# Patient Record
Sex: FEMALE | Race: WHITE | Employment: FULL TIME | URBAN - NONMETROPOLITAN AREA
[De-identification: names, ages, dates, MRNs, and addresses within clinical notes are randomized per-mention and may not be internally consistent; named-entity substitution may affect disease eponyms.]

---

## 2020-02-13 ENCOUNTER — OFFICE VISIT (OUTPATIENT)
Dept: CARDIOLOGY CLINIC | Age: 60
End: 2020-02-13
Payer: COMMERCIAL

## 2020-02-13 VITALS
HEART RATE: 87 BPM | BODY MASS INDEX: 41.95 KG/M2 | HEIGHT: 70 IN | WEIGHT: 293 LBS | DIASTOLIC BLOOD PRESSURE: 78 MMHG | SYSTOLIC BLOOD PRESSURE: 139 MMHG

## 2020-02-13 PROCEDURE — 99244 OFF/OP CNSLTJ NEW/EST MOD 40: CPT | Performed by: INTERNAL MEDICINE

## 2020-02-13 PROCEDURE — 93000 ELECTROCARDIOGRAM COMPLETE: CPT | Performed by: INTERNAL MEDICINE

## 2020-02-13 RX ORDER — PIOGLITAZONEHYDROCHLORIDE 30 MG/1
30 TABLET ORAL DAILY
COMMUNITY

## 2020-02-13 RX ORDER — AMLODIPINE BESYLATE 10 MG/1
10 TABLET ORAL DAILY
COMMUNITY

## 2020-02-13 RX ORDER — FUROSEMIDE 20 MG/1
20 TABLET ORAL DAILY
Qty: 60 TABLET | Refills: 3 | Status: SHIPPED | OUTPATIENT
Start: 2020-02-13 | End: 2020-08-13

## 2020-02-13 RX ORDER — GEMFIBROZIL 600 MG/1
600 TABLET, FILM COATED ORAL
COMMUNITY

## 2020-02-13 RX ORDER — LOSARTAN POTASSIUM AND HYDROCHLOROTHIAZIDE 12.5; 1 MG/1; MG/1
1 TABLET ORAL DAILY
COMMUNITY

## 2020-02-13 RX ORDER — METOPROLOL SUCCINATE 50 MG/1
50 TABLET, EXTENDED RELEASE ORAL DAILY
COMMUNITY
End: 2022-09-15 | Stop reason: ALTCHOICE

## 2020-02-13 RX ORDER — LEVOTHYROXINE SODIUM 112 UG/1
112 TABLET ORAL DAILY
COMMUNITY

## 2020-02-13 RX ORDER — PEN NEEDLE, DIABETIC 31 GX5/16"
1 NEEDLE, DISPOSABLE MISCELLANEOUS DAILY
COMMUNITY
End: 2021-09-22

## 2020-02-13 RX ORDER — CLONIDINE HYDROCHLORIDE 0.1 MG/1
0.1 TABLET ORAL 2 TIMES DAILY
COMMUNITY

## 2020-02-13 RX ORDER — M-VIT,TX,IRON,MINS/CALC/FOLIC 27MG-0.4MG
1 TABLET ORAL DAILY
COMMUNITY

## 2020-02-13 RX ORDER — LISINOPRIL 10 MG/1
10 TABLET ORAL DAILY
COMMUNITY

## 2020-02-13 NOTE — PROGRESS NOTES
aneurysm at age 38    Past Surgical History   Cholecystectomy  Colonoscopy     Review of Systems   Constitutional: Negative for chills and fever  HENT: Negative for congestion, sinus pressure, sneezing and sore throat. Eyes: Negative for pain, discharge, redness and itching. Respiratory: Negative for apnea, cough  Gastrointestinal: Negative for blood in stool, constipation, diarrhea   Endocrine: Negative for cold intolerance, heat intolerance, polydipsia. Genitourinary: Negative for dysuria, enuresis, flank pain and hematuria. Musculoskeletal: Negative for arthralgias, joint swelling and neck pain. Neurological: Negative for numbness and headaches. Psychiatric/Behavioral: Negative for agitation, confusion, decreased concentration and dysphoric mood. Objective: There were no vitals taken for this visit. Wt Readings from Last 3 Encounters:   No data found for Wt     BP Readings from Last 3 Encounters:   No data found for BP       Nursing note and vitals reviewed. Physical Exam   Constitutional: Oriented to person, place, and time. Appears well-developed and well-nourished. ENT: Moist mucous membranes. No bleeding. Tongue is midline. Head: Normocephalic and atraumatic. Eyes: EOM are normal. Pupils are equal, round, and reactive to light. Neck: Normal range of motion. Neck supple. No JVD present. Cardiovascular: Normal rate, regular rhythm, III/VI systolic murmur, no rubs, and intact distal pulses. Pulmonary/Chest: Effort normal and breath sounds normal. No respiratory distress. No wheezes. No rales. Abdominal: Soft. Bowel sounds are normal. No distension. There is no tenderness. Musculoskeletal: Normal range of motion. +1 pitting edema, right>left. Right leg mildly erythematous. Neurological: Alert and oriented to person, place, and time. No cranial nerve deficit. Coordination normal.   Skin: Skin is warm and dry. Psychiatric: Normal mood and affect.        No results found for: CKTOTAL, CKMB, CKMBINDEX    No results found for: WBC, RBC, HGB, HCT, MCV, MCH, MCHC, RDW, PLT, MPV    No results found for: NA, K, CL, CO2, BUN, LABALBU, CREATININE, CALCIUM, GFRAA, LABGLOM, GLUCOSE    No results found for: ALKPHOS, ALT, AST, PROT, BILITOT, BILIDIR, IBILI, LABALBU    No results found for: MG    No results found for: INR, PROTIME      No results found for: LABA1C    No results found for: TRIG, HDL, LDLCALC, LDLDIRECT, LABVLDL    No results found for: TSH      Testing Reviewed:      I have individually reviewed the cardiac test below:    ECHO: No results found for this or any previous visit. Ekg:   EKG Interpretation:  normal sinus rhythm, anterior infarct. Stress Test:02/04/2020  Patient Name: Jaden Juarez MR #: [id number] YOB: 1960 Gender: F Service Type: DGT Pt. Type: O Ordering Phys: Jatinder Chavez MD Account #: [id number] Admitting Phys: Accession #: [id number] Family Phys: Additional CC Phys: CARDIOLOGY REPORT EXAM: STRESS ECHOCARDIOGRAM Exam Date: 02/04/2020 Exam Time: 11:00:14 CLINICAL HISTORY: chest pain Study Date: 2020-02-04 11:00:14 Referring Physician: Vu Every: 1407 Quality: Good Procedures: Stress Echo Report: Treadmill stress echocardiogram. Indications: Chest Pain, and The patient was educated about the procedure. Findings: ECG At Rest: Resting ECG shows normal sinus rhythm. Procedure Data: [place] Protocol. Baseline HR 79 BPM. Baseline /80. Target  BPM. Predicted Maximal  BPM. Exercise Time 1:02. RPP 28715 BPMmmHg. METS Achieved 4.6. Peak  BPM. BP at Peak Exercise 170/85. Percent HR Achieved 75 %. M-Mode measurements: LAD 4.7 cm. AoRD 3.1 cm. RVIDs . 99cm. LVPWs 1.48 cm. LVIDs 4.17 IVSs 1.65cm. LVPWd 1.2 cm. LVIDd 6.1 cm. IVSd 1.0 cm. Ejection Fraction 60%. Arrhythmia: No arrhythmias noted during stress. ECG Post Exercise: No diagnostic ST changes.  Resting LV Function: Normal left ventricular size and global systolic function. Baseline Ejection Fraction is estimated at 60 %. Exercise Stress LV Function: Physiologic decrease in the LV chamber size with appropriate augmentation in global and regional contractility relative to pre-stress. Conclusions: Submaximal stress test with no ECG evidence of ischemia. Unfortunately due to patient's extreme limitation, she was not able to exercise more than 1 minute and was not able to acheive target heart rate. This test is inconclusive for ischemia. Consider a chemical nuclear stress test if clinically indicated. Electronically Signed By: Lisbeth Irene M.D 2020-02-04 18:40:10 EST [id number]  Status: Completed 4-Feb-2020 11:00  Indication:Chest pain   Echocardiogram Stress Doppler [facility]        AssessmentPlan:     Mariela Cespedes is a pleasant 60year old male patient who has past medical history of DM, HTN, Dyslipidemia, hypothyroidism, and colon adenoma. The patient has recently undergone an exercise stress echocardiogram to evaluate for chest pain. Per stress test report available, study was submaximal due to patient's severe limitation, he could not exercise more that one minute, THR was not achieved, test was inconclusive for ischemia. Further evaluation and consideration for nuclear stress test was recommended. She chronic leg swelling, right side worse with some redness. The patient reports retrosternal chest pain, tightness like, lasting up to 10 minutes, 7/10 in severity, without radiation without. This episode occurred two months and have occurred intermittently since then (milder, once per week). She reports shortness of breath and dyspnea on exertion that have worsened over the past few months. Even climbing few steps gives her shortness of breath and dyspnea. Patient denies orthopnea, paroxysmal nocturnal dyspnea, palpitations, dizziness, syncope, weight gain. 1. Chest pain  2. Worsening Dyspnea on exertion   3. Worsening SOB  4. LE edema  5.  LE pain,

## 2020-02-19 ENCOUNTER — TELEPHONE (OUTPATIENT)
Dept: CARDIOLOGY CLINIC | Age: 60
End: 2020-02-19

## 2020-02-19 LAB
B-TYPE NATRIURETIC PEPTIDE: 71 PG/ML
BUN BLDV-MCNC: 24 MG/DL
CALCIUM SERPL-MCNC: 9.9 MG/DL
CHLORIDE BLD-SCNC: 100 MMOL/L
CHOLESTEROL, TOTAL: 159 MG/DL
CHOLESTEROL/HDL RATIO: NORMAL
CO2: 25 MMOL/L
CREAT SERPL-MCNC: 0.8 MG/DL
GFR CALCULATED: NORMAL
GLUCOSE BLD-MCNC: 191 MG/DL
HDLC SERPL-MCNC: 43 MG/DL (ref 35–70)
LDL CHOLESTEROL CALCULATED: 80 MG/DL (ref 0–160)
POTASSIUM SERPL-SCNC: 4.7 MMOL/L
SODIUM BLD-SCNC: 140 MMOL/L
TRIGL SERPL-MCNC: 179 MG/DL
VLDLC SERPL CALC-MCNC: 36 MG/DL

## 2020-03-04 ENCOUNTER — HOSPITAL ENCOUNTER (OUTPATIENT)
Dept: NON INVASIVE DIAGNOSTICS | Age: 60
Discharge: HOME | End: 2020-03-04
Payer: COMMERCIAL

## 2020-03-04 ENCOUNTER — HOSPITAL ENCOUNTER (OUTPATIENT)
Dept: INTERVENTIONAL RADIOLOGY/VASCULAR | Age: 60
Discharge: HOME | End: 2020-03-04
Payer: COMMERCIAL

## 2020-03-04 VITALS — BODY MASS INDEX: 41.95 KG/M2 | WEIGHT: 293 LBS | HEIGHT: 70 IN

## 2020-03-04 LAB
LV EF: 63 %
LVEF MODALITY: NORMAL

## 2020-03-04 PROCEDURE — 93970 EXTREMITY STUDY: CPT

## 2020-03-04 PROCEDURE — A9500 TC99M SESTAMIBI: HCPCS | Performed by: INTERNAL MEDICINE

## 2020-03-04 PROCEDURE — 78452 HT MUSCLE IMAGE SPECT MULT: CPT

## 2020-03-04 PROCEDURE — 93306 TTE W/DOPPLER COMPLETE: CPT

## 2020-03-04 PROCEDURE — 3430000000 HC RX DIAGNOSTIC RADIOPHARMACEUTICAL: Performed by: INTERNAL MEDICINE

## 2020-03-04 PROCEDURE — 6360000002 HC RX W HCPCS

## 2020-03-04 PROCEDURE — 93017 CV STRESS TEST TRACING ONLY: CPT

## 2020-03-04 RX ADMIN — Medication 35 MILLICURIE: at 12:05

## 2020-03-04 RX ADMIN — Medication 10 MILLICURIE: at 11:00

## 2020-03-10 ENCOUNTER — TELEPHONE (OUTPATIENT)
Dept: CARDIOLOGY CLINIC | Age: 60
End: 2020-03-10

## 2020-08-13 ENCOUNTER — OFFICE VISIT (OUTPATIENT)
Dept: CARDIOLOGY CLINIC | Age: 60
End: 2020-08-13
Payer: COMMERCIAL

## 2020-08-13 VITALS
DIASTOLIC BLOOD PRESSURE: 72 MMHG | HEART RATE: 81 BPM | WEIGHT: 293 LBS | SYSTOLIC BLOOD PRESSURE: 128 MMHG | BODY MASS INDEX: 41.95 KG/M2 | HEIGHT: 70 IN

## 2020-08-13 PROCEDURE — 93000 ELECTROCARDIOGRAM COMPLETE: CPT | Performed by: INTERNAL MEDICINE

## 2020-08-13 PROCEDURE — 99214 OFFICE O/P EST MOD 30 MIN: CPT | Performed by: INTERNAL MEDICINE

## 2020-08-13 RX ORDER — AMOXICILLIN 500 MG/1
500 CAPSULE ORAL 3 TIMES DAILY
COMMUNITY
End: 2021-02-23

## 2020-08-13 RX ORDER — CEPHALEXIN 500 MG/1
500 CAPSULE ORAL 3 TIMES DAILY
Qty: 21 CAPSULE | Refills: 0 | Status: SHIPPED | OUTPATIENT
Start: 2020-08-13 | End: 2020-08-20

## 2020-08-13 RX ORDER — FUROSEMIDE 40 MG/1
40 TABLET ORAL DAILY
Qty: 40 TABLET | Refills: 3 | Status: SHIPPED | OUTPATIENT
Start: 2020-08-13 | End: 2021-01-22 | Stop reason: SDUPTHER

## 2020-08-13 NOTE — PROGRESS NOTES
Pt here for 6 mo check up     EKG done today    Pt continues with sob , chest pressure and heaviness,     Pt denies heart palpitations, dizziness, swelling in legs and feet, redness notices more in right lower leg

## 2020-08-13 NOTE — PROGRESS NOTES
[address] [place] [address],[place] [address]  [place] [address]  Dept: [phone]  Dept Fax: [phone]  Loc: [phone]    Visit Date: 8/13/2020    Ms. Phil Lizarraga is a 60 y.o. female  who presented for:    Leg edema  Left leg redness    HPI:   KRISH Leonard is a pleasant 60year old female patient who  has a past medical history of Diabetes mellitus ([address] and Hypertension. She was previously evaluated for chest pain, RAVI, leg edema. Stress test 03/2020 revealed an anterior attenuation artifact, no apparent ischemia. Echocardiogram 03/2020 revealed LVH, EF 60-65%, mild MR, Mild TR, RVSP 35-40mmHg, possible PFO/?ASD with normal RA/RV. The patient reports having occasional chest discomfort and RAVI. For the past two weeks, the patient has noticed right leg discomfort and redness. She has mild chills. Patient denies orthopnea, paroxysmal nocturnal dyspnea, palpitations, dizziness, syncope, weight gain.        Current Outpatient Medications:     Insulin NPH Isophane & Regular (NOVOLIN 70/30 FLEXPEN) (70-30) 100 UNIT per ML injection pen, Inject into the skin 2 times daily, Disp: , Rfl:     pioglitazone (ACTOS) 30 MG tablet, Take 30 mg by mouth daily, Disp: , Rfl:     aspirin 81 MG tablet, Take 81 mg by mouth daily, Disp: , Rfl:     cloNIDine (CATAPRES) 0.1 MG tablet, Take 0.1 mg by mouth 2 times daily, Disp: , Rfl:     losartan-hydrochlorothiazide (HYZAAR) 100-12.5 MG per tablet, Take 1 tablet by mouth daily, Disp: , Rfl:     sitaGLIPtan-metformin (JANUMET)  MG per tablet, Take 1 tablet by mouth 2 times daily (with meals), Disp: , Rfl:     lisinopril (PRINIVIL;ZESTRIL) 10 MG tablet, Take 10 mg by mouth daily, Disp: , Rfl:     gemfibrozil (LOPID) 600 MG tablet, Take 600 mg by mouth 2 times daily (before meals), Disp: , Rfl:     metoprolol succinate (TOPROL XL) 50 MG extended release tablet, Take 50 mg by mouth daily, Disp: , Rfl:    amLODIPine (NORVASC) 10 MG tablet, Take 10 mg by mouth daily, Disp: , Rfl:     Insulin Pen Needle (PEN NEEDLES 31GX5/16\") 31G X 8 MM MISC, 1 each by Does not apply route daily, Disp: , Rfl:     blood glucose test strips (RELION GLUCOSE TEST STRIPS) strip, 1 each by In Vitro route daily As needed. , Disp: , Rfl:     blood glucose test strips (RELION GLUCOSE TEST STRIPS) strip, 1 each by In Vitro route daily As needed. , Disp: , Rfl:     levothyroxine (SYNTHROID) 112 MCG tablet, Take 112 mcg by mouth Daily, Disp: , Rfl:     Calcium Carb-Cholecalciferol (CALCIUM 600+D3 PO), Take by mouth, Disp: , Rfl:     Coenzyme Q10-Red Yeast Rice (CO Q-10 PLUS RED YEAST RICE PO), Take by mouth, Disp: , Rfl:     Multiple Vitamins-Minerals (THERAPEUTIC MULTIVITAMIN-MINERALS) tablet, Take 1 tablet by mouth daily, Disp: , Rfl:     furosemide (LASIX) 20 MG tablet, Take 1 tablet by mouth daily, Disp: 60 tablet, Rfl: 3    Past Medical History  Lauryn Samaniego  has a past medical history of Diabetes mellitus ([facility] [address] and Hypertension. Social History  Lauryn Samaniego  reports that she has never smoked. She has never used smokeless tobacco.    Family History  Lauryn Samaniego family history is not on file. There is no family history of bicuspid aortic valve, aneurysms, heart transplant, pacemakers, defibrillators, or sudden cardiac death. Past Surgical History   Past Surgical History:   Procedure Laterality Date    GALLBLADDER SURGERY         Review of Systems   Constitutional: Negative for chills and fever  HENT: Negative for congestion, sinus pressure, sneezing and sore throat. Eyes: Negative for pain, discharge, redness and itching. Respiratory: Negative for apnea, cough  Gastrointestinal: Negative for blood in stool, constipation, diarrhea   Endocrine: Negative for cold intolerance, heat intolerance, polydipsia. Genitourinary: Negative for dysuria, enuresis, flank pain and hematuria.    Musculoskeletal: Negative for arthralgias, joint swelling and neck pain.   Neurological: Negative for numbness and headaches. Psychiatric/Behavioral: Negative for agitation, confusion, decreased concentration and dysphoric mood. Objective: There were no vitals taken for this visit. Wt Readings from Last 3 Encounters:   03/04/20 300 lb (136.1 kg)   02/13/20 (!) 314 lb 6.4 oz (142.6 kg)     BP Readings from Last 3 Encounters:   02/13/20 139/78       Nursing note and vitals reviewed. Physical Exam   Constitutional: Oriented to person, place, and time. Appears well-developed and well-nourished. ENT: Moist mucous membranes. No bleeding. Tongue is midline. Head: Normocephalic and atraumatic. Eyes: EOM are normal. Pupils are equal, round, and reactive to light. Neck: Normal range of motion. Neck supple. No JVD present. Cardiovascular: Normal rate, regular rhythm, sysolic murmur, no rubs, and intact distal pulses. Pulmonary/Chest: Effort normal and breath sounds normal. No respiratory distress. No wheezes. No rales. Abdominal: Soft. Bowel sounds are normal. No distension. There is no tenderness. Musculoskeletal: Normal range of motion. positive edema. Right leg erythema and warmth    Neurological: Alert and oriented to person, place, and time. No cranial nerve deficit. Coordination normal.   Skin: Skin is warm and dry. Psychiatric: Normal mood and affect.        No results found for: CKTOTAL, CKMB, CKMBINDEX    No results found for: WBC, RBC, HGB, HCT, MCV, MCH, MCHC, RDW, PLT, MPV    Lab Results   Component Value Date     02/19/2020    K 4.7 02/19/2020     02/19/2020    CO2 25.0 02/19/2020    BUN 24 02/19/2020    CREATININE 0.8 02/19/2020    CALCIUM 9.9 02/19/2020    GLUCOSE 191 02/19/2020       No results found for: ALKPHOS, ALT, AST, PROT, BILITOT, BILIDIR, IBILI, LABALBU    No results found for: MG    No results found for: INR, PROTIME      No results found for: LABA1C    Lab Results   Component Value Date    TRIG 179 02/19/2020    HDL 43 02/19/2020    [address] 02/19/2020       No results found for: TSH      Testing Reviewed:      I have individually reviewed the cardiac test below:    ECHO:   Results for orders placed during the hospital encounter of 03/04/20   Echo 2D w doppler w color complete    Narrative Transthoracic Echocardiography Report (TTE)     Demographics      Patient Name     Humberto Aquino Gender                 Female      MR #             [id number]     Race                                                        Ethnicity      Account #        [de-identified]     Room Number      Accession Number [id number]     Date of Study          03/04/2020      Date of Birth    1960    Referring Physician    Nicole Velez MD      Age              60 year(s)    Erwin Cuevas [facility]                                     Interpreting Physician Nicole Velez MD     Procedure    Type of Study      TTE procedure:ECHOCARDIOGRAM COMPLETE 2D W DOPPLER W COLOR. Procedure Date  Date: 03/04/2020 Start: 09:39 AM    Study Location: Echo Lab  Technical Quality: Adequate visualization    Indications:Congestive heart failure and Increased shortness of breath. Additional Medical History:RAVI, Hypertension, Diabetes, Chest pain, Leg  edema, Colon cancer, Family history of heart disease    Patient Status: Routine    Height: 69.69 inches Weight: 300.01 pounds BSA: 2.47 m^2 BMI: 43.44 kg/m^2    BP: 139/78 mmHg     Conclusions      Summary   Left ventricle size is normal.   Mild concentric left ventricular hypertrophy. There were no regional wall motion abnormalities. Ejection fraction is visually estimated in the range of 60% to 65%. Mild mitral regurgitation is present. Mild tricuspid regurgitation. Right ventricular systolic pressure of 56-85 mmHg consistent with mild   pulmonary hypertension. Possible PFO/ASD visualized. Further interrogation recommended if   clinically indicated.       Signature 147   Septum         ml/60 m^2LV ESV/LV ESV    RV Diastolic Dimension: 2.7 cm   Diastolic: 1.1 Index: 71.4 ml/22 m^2   cm             EF Calculated: 63 %       LA/Aorta: 1.52                                               LA volume/Index: 68.3 ml /28m^2     Doppler Measurements & Calculations      MV Peak E-Wave: 104 cm/s   AV Peak Velocity: 149 LVOT Peak Velocity: 88.7   MV Peak A-Wave: 101 cm/s   cm/s                  cm/s   MV E/A Ratio: 1.03         AV Peak Gradient:     LVOT Peak Gradient: 3   MV Peak Gradient: 4.33     8.88 mmHg             mmHg   mmHg                                                    TV Peak E-Wave: 56.6 cm/s   MV Deceleration Time: 165                        TV Peak A-Wave: 69 cm/s   msec                              IVRT: 60 msec         TV Peak Gradient: 1.28                                                    mmHg   MV E' Septal Velocity: 6.2                       TR Velocity:276 cm/s   cm/s                       AV DVI (Vmax):0.6     TR Gradient:30.47 mmHg   MV A' Septal Velocity: 9.2                       PV Peak Velocity: 83.1   cm/s                                             cm/s   MV E' Lateral Velocity:                          PV Peak Gradient: 2.76   6.7 cm/s                                         mmHg   MV A' Lateral Velocity:   10.2 cm/s   E/E' septal: 16.77   E/E' lateral: 15.52   MR Velocity: 419 cm/s     [email or web address] [id number]%[id number]        Ekg:   EKG Interpretation:  normal EKG, normal sinus rhythm, nonspecific ST and T waves changes, unchanged from previous tracings. Stress Test:03/2020   Summary   LVEF is preserved. There is mild attenuation artifact noted in the anterior wall seems to be   related to breast artifact. No segmental wall motion abnormalities on gated study. This study was negative for ischemia. Recommendation   Medical management. Clinical correlation is recommended. AssessmentPlan:     Quique Jimenez is a pleasant 60year old female patient who  has a past medical history of Diabetes mellitus ([address] and Hypertension. She was previously evaluated for chest pain, RAVI, leg edema. Stress test 03/2020 revealed an anterior attenuation artifact, no apparent ischemia. Echocardiogram 03/2020 revealed LVH, EF 60-65%, mild MR, Mild TR, RVSP 35-40mmHg, possible PFO/?ASD with normal RA/RV. 1. CP, RAVI, Leg edema - improved   2. HFpEF, chronic  3. Mild pulmonary hypertension   4. PFO Vs ??ASD  5. LVH  6. Hypertensive cardiovascular disease  7. Mild mitral regurgitation  8. EF 60-65% on Echo 03/2020  9. DM  10. Hypertension   11. Left cellulitis      Stress 03/2020 revealed an anterior wall attenuation artifact, no apparent ischemia    · LIMIT SALT INTAKE  · Daily weight   · Lasix 20 mg po daily  · Increase lasix to 40 mg po daily  · BMP, BNP, Mg in one week   · ASA 81 mg po daily   · Patient counseled about cellulitis and need to see PCP, will see soon  · Start Keflex       Above findings and plan of care were discussed with patient in details, patient's questions were answered.      Disposition:  RTC in 6 months             Electronically signed by Topher Sampson MD, [address], [place]    8/13/2020 at 1:54 PM EDT

## 2020-09-02 LAB
B-TYPE NATRIURETIC PEPTIDE: 177 PG/ML
BUN BLDV-MCNC: 28 MG/DL
CALCIUM SERPL-MCNC: 9.6 MG/DL
CHLORIDE BLD-SCNC: 100 MMOL/L
CO2: 24 MMOL/L
CREAT SERPL-MCNC: NORMAL MG/DL
GFR CALCULATED: NORMAL
GLUCOSE BLD-MCNC: 146 MG/DL
MAGNESIUM: 2.1 MG/DL
POTASSIUM SERPL-SCNC: 4.7 MMOL/L
SODIUM BLD-SCNC: 138 MMOL/L

## 2021-01-22 RX ORDER — FUROSEMIDE 40 MG/1
40 TABLET ORAL DAILY
Qty: 90 TABLET | Refills: 3 | Status: SHIPPED | OUTPATIENT
Start: 2021-01-22 | End: 2021-09-22

## 2021-02-23 ENCOUNTER — OFFICE VISIT (OUTPATIENT)
Dept: CARDIOLOGY CLINIC | Age: 61
End: 2021-02-23
Payer: COMMERCIAL

## 2021-02-23 VITALS
BODY MASS INDEX: 41.95 KG/M2 | DIASTOLIC BLOOD PRESSURE: 79 MMHG | HEIGHT: 70 IN | WEIGHT: 293 LBS | SYSTOLIC BLOOD PRESSURE: 128 MMHG | HEART RATE: 76 BPM

## 2021-02-23 DIAGNOSIS — I50.32 CHRONIC HEART FAILURE WITH PRESERVED EJECTION FRACTION (HFPEF) (HCC): Primary | ICD-10-CM

## 2021-02-23 PROCEDURE — 99214 OFFICE O/P EST MOD 30 MIN: CPT | Performed by: INTERNAL MEDICINE

## 2021-02-23 NOTE — PROGRESS NOTES
[address] [facility] [address]  [place] [address]  Dept: [phone]  Dept Fax: [phone]  Loc: [phone]    Visit Date: 2/23/2021    Ms. Suzy Rahman is a 63 y.o. female  who presented for:    Leg edema  HFpEF    HPI:   KRISH Gregory is a pleasant 63year old female patient who  has a past medical history of Diabetes mellitus ([address] and Hypertension. She was previously evaluated for chest pain, RAVI, leg edema. Stress test 03/2020 revealed an anterior attenuation artifact, no apparent ischemia. Echocardiogram 03/2020 revealed LVH, EF 60-65%, mild MR, Mild TR, RVSP 35-40mmHg, possible PFO/?ASD with normal RA/RV. She has chronic leg edema, improved with stockings, followed at lymphedema clinic. Patient denies chest pain, orthopnea, paroxysmal nocturnal dyspnea, palpitations, dizziness, syncope, weight gain. She reports occasional shortness of breath, dyspnea on exertion.        Current Outpatient Medications:     furosemide (LASIX) 40 MG tablet, Take 1 tablet by mouth daily, Disp: 90 tablet, Rfl: 3    amoxicillin (AMOXIL) 500 MG capsule, Take 500 mg by mouth 3 times daily, Disp: , Rfl:     Insulin NPH Isophane & Regular (NOVOLIN 70/30 FLEXPEN) (70-30) 100 UNIT per ML injection pen, Inject into the skin 2 times daily, Disp: , Rfl:     pioglitazone (ACTOS) 30 MG tablet, Take 30 mg by mouth daily, Disp: , Rfl:     aspirin 81 MG tablet, Take 81 mg by mouth daily, Disp: , Rfl:     cloNIDine (CATAPRES) 0.1 MG tablet, Take 0.1 mg by mouth 2 times daily, Disp: , Rfl:     losartan-hydrochlorothiazide (HYZAAR) 100-12.5 MG per tablet, Take 1 tablet by mouth daily, Disp: , Rfl:     sitaGLIPtan-metformin (JANUMET)  MG per tablet, Take 1 tablet by mouth 2 times daily (with meals), Disp: , Rfl:     lisinopril (PRINIVIL;ZESTRIL) 10 MG tablet, Take 10 mg by mouth daily, Disp: , Rfl:     gemfibrozil (LOPID) 600 MG tablet, Take 600 mg by mouth 2 times daily (before meals), Disp: , Rfl:     metoprolol succinate (TOPROL XL) 50 MG extended release tablet, Take 50 mg by mouth daily, Disp: , Rfl:     amLODIPine (NORVASC) 10 MG tablet, Take 10 mg by mouth daily, Disp: , Rfl:     Insulin Pen Needle (PEN NEEDLES 31GX5/16\") 31G X 8 MM MISC, 1 each by Does not apply route daily, Disp: , Rfl:     blood glucose test strips (RELION GLUCOSE TEST STRIPS) strip, 1 each by In Vitro route daily As needed. , Disp: , Rfl:     blood glucose test strips (RELION GLUCOSE TEST STRIPS) strip, 1 each by In Vitro route daily As needed. , Disp: , Rfl:     levothyroxine (SYNTHROID) 112 MCG tablet, Take 112 mcg by mouth Daily, Disp: , Rfl:     Calcium Carb-Cholecalciferol (CALCIUM 600+D3 PO), Take by mouth, Disp: , Rfl:     Coenzyme Q10-Red Yeast Rice (CO Q-10 PLUS RED YEAST RICE PO), Take by mouth, Disp: , Rfl:     Multiple Vitamins-Minerals (THERAPEUTIC MULTIVITAMIN-MINERALS) tablet, Take 1 tablet by mouth daily, Disp: , Rfl:     Past Medical History  Charlee Kaufman  has a past medical history of Diabetes mellitus ([address] and Hypertension. Social History  Charlee Kaufman  reports that she has never smoked. She has never used smokeless tobacco.    Family History  Charlee Kaufman family history is not on file. There is no family history of bicuspid aortic valve, aneurysms, heart transplant, pacemakers, defibrillators, or sudden cardiac death. Past Surgical History   Past Surgical History:   Procedure Laterality Date    GALLBLADDER SURGERY         Review of Systems   Constitutional: Negative for chills and fever  HENT: Negative for congestion, sinus pressure, sneezing and sore throat. Eyes: Negative for pain, discharge, redness and itching. Respiratory: Negative for apnea, cough  Gastrointestinal: Negative for blood in stool, constipation, diarrhea   Endocrine: Negative for cold intolerance, heat intolerance, polydipsia.   Genitourinary: Negative for dysuria, enuresis, flank pain and hematuria. Musculoskeletal: Negative for arthralgias, joint swelling and neck pain. Neurological: Negative for numbness and headaches. Psychiatric/Behavioral: Negative for agitation, confusion, decreased concentration and dysphoric mood. Objective: There were no vitals taken for this visit. Wt Readings from Last 3 Encounters:   08/13/20 (!) 326 lb 2 oz (147.9 kg)   03/04/20 300 lb (136.1 kg)   02/13/20 (!) 314 lb 6.4 oz (142.6 kg)     BP Readings from Last 3 Encounters:   08/13/20 128/72   02/13/20 139/78       Nursing note and vitals reviewed. Physical Exam   Constitutional: Oriented to person, place, and time. Appears well-developed and well-nourished. ENT: Moist mucous membranes. No bleeding. Tongue is midline. Head: Normocephalic and atraumatic. Eyes: EOM are normal. Pupils are equal, round, and reactive to light. Neck: Normal range of motion. Neck supple. No JVD present. Cardiovascular: Normal rate, regular rhythm, murmur, no rubs, and intact distal pulses. Pulmonary/Chest: Effort normal and breath sounds normal. No respiratory distress. No wheezes. No rales. Abdominal: Soft. Bowel sounds are normal. No distension. There is no tenderness. Musculoskeletal: Normal range of motion. trace edema. Neurological: Alert and oriented to person, place, and time. No cranial nerve deficit. Coordination normal.   Skin: Skin is warm and dry. Psychiatric: Normal mood and affect.        No results found for: CKTOTAL, CKMB, CKMBINDEX    No results found for: WBC, RBC, HGB, HCT, MCV, MCH, MCHC, RDW, PLT, MPV    Lab Results   Component Value Date     09/02/2020    K 4.7 09/02/2020     09/02/2020    CO2 24.0 09/02/2020    BUN 28 09/02/2020    CREATININE 0.8 02/19/2020    CALCIUM 9.6 09/02/2020    GLUCOSE 146 09/02/2020       No results found for: ALKPHOS, ALT, AST, PROT, BILITOT, BILIDIR, IBILI, LABALBU    Lab Results   Component Value Date    MG 2.1 09/02/2020       No results found for: INR, PROTIME      No results found for: LABA1C    Lab Results   Component Value Date    TRIG 179 02/19/2020    HDL 43 02/19/2020    LDLCALC 80 02/19/2020       No results found for: TSH      Testing Reviewed:      I have individually reviewed the cardiac test below:    ECHO:   Results for orders placed during the hospital encounter of 03/04/20   Echo 2D w doppler w color complete    Narrative Transthoracic Echocardiography Report (TTE)     Demographics      Patient Name     Karmen Ortiz Gender                 Female      MR #             [id number]     Race                                                        Ethnicity      Account #        [de-identified]     Room Number      Accession Number [id number]     Date of Study          03/04/2020      Date of Birth    1960    Referring Physician    Hector Monteiro MD      Age              60 year(s)    Luanne Ferraro FRANKY                                     Interpreting Physician Hector Monteiro MD     Procedure    Type of Study      TTE procedure:ECHOCARDIOGRAM COMPLETE 2D W DOPPLER W COLOR. Procedure Date  Date: 03/04/2020 Start: 09:39 AM    Study Location: Echo Lab  Technical Quality: Adequate visualization    Indications:Congestive heart failure and Increased shortness of breath. Additional Medical History:RAVI, Hypertension, Diabetes, Chest pain, Leg  edema, Colon cancer, Family history of heart disease    Patient Status: Routine    Height: 69.69 inches Weight: 300.01 pounds BSA: 2.47 m^2 BMI: 43.44 kg/m^2    BP: 139/78 mmHg     Conclusions      Summary   Left ventricle size is normal.   Mild concentric left ventricular hypertrophy. There were no regional wall motion abnormalities. Ejection fraction is visually estimated in the range of 60% to 65%. Mild mitral regurgitation is present. Mild tricuspid regurgitation. Right ventricular systolic pressure of 76-66 mmHg consistent with mild   pulmonary hypertension. Possible PFO/ASD visualized. Further interrogation recommended if   clinically indicated. Signature      ----------------------------------------------------------------   Electronically signed by Aditya Reyes MD (Interpreting   physician) on 03/04/2020 at 05:12 PM   ----------------------------------------------------------------      Findings      Mitral Valve   Structurally normal mitral valve. Mild mitral regurgitation is present. Aortic Valve   The aortic valve was trileaflet with normal thickness and cuspal   separation. DOPPLER: Transaortic velocity was within the normal range with   no evidence of aortic stenosis. There was no evidence of aortic   regurgitation. Tricuspid Valve   Tricuspid valve is structurally normal.   Mild tricuspid regurgitation. Right ventricular systolic pressure of 29-14 mmHg consistent with mild   pulmonary hypertension. Pulmonic Valve   Pulmonic valve is structurally normal.      Left Atrium   Normal size left atrium. Left Ventricle   Left ventricle size is normal.   Mild concentric left ventricular hypertrophy. There were no regional wall motion abnormalities. Ejection fraction is visually estimated in the range of 60% to 65%. Right Atrium   The right atrium is of normal size. Possible PFO/ASD visualized. Further interrogation recommended if   clinically indicated. Right Ventricle   Normal right ventricular size and function. Pericardial Effusion   There is a small (trivial) pericardial effusion. Pleural Effusion   No evidence of pleural effusion. Aorta / Great Vessels   The aorta is within normal limits. IVC not visualized.      M-Mode/2D Measurements & Calculations      LV Diastolic   LV Systolic Dimension:    AV Cusp Separation: 2.2 cmLA   Dimension: 5.5 3.6 cm                    Dimension: 4.1 cmAO Root   cm             LV Volume Diastolic: 721  Dimension: 2.7 cmLA Area: 22.1   LV FS:34.6 %   ml cm^2   LV PW          LV Volume Systolic: 41.8   Diastolic: 1.1 ml   cm             LV EDV/LV EDV Index: 147   Septum         ml/60 m^2LV ESV/LV ESV    RV Diastolic Dimension: 2.7 cm   Diastolic: 1.1 Index: 03.0 ml/22 m^2   cm             EF Calculated: 63 %       LA/Aorta: 1.52                                               LA volume/Index: 68.3 ml /28m^2     Doppler Measurements & Calculations      MV Peak E-Wave: 104 cm/s   AV Peak Velocity: 149 LVOT Peak Velocity: 88.7   MV Peak A-Wave: 101 cm/s   cm/s                  cm/s   MV E/A Ratio: 1.03         AV Peak Gradient:     LVOT Peak Gradient: 3   MV Peak Gradient: 4.33     8.88 mmHg             mmHg   mmHg                                                    TV Peak E-Wave: 56.6 cm/s   MV Deceleration Time: 165                        TV Peak A-Wave: 69 cm/s   msec                              IVRT: 60 msec         TV Peak Gradient: 1.28                                                    mmHg   MV E' Septal Velocity: 6.2                       TR Velocity:276 cm/s   cm/s                       AV DVI (Vmax):0.6     TR Gradient:30.47 mmHg   MV A' Septal Velocity: 9.2                       PV Peak Velocity: 83.1   cm/s                                             cm/s   MV E' Lateral Velocity:                          PV Peak Gradient: 2.76   6.7 cm/s                                         mmHg   MV A' Lateral Velocity:   10.2 cm/s   E/E' septal: 16.77   E/E' lateral: 15.52   MR Velocity: 419 cm/s     [email or web address] [id number]%3d%3d      Stress Test:03/2020   Summary   LVEF is preserved.   There is mild attenuation artifact noted in the anterior wall seems to be   related to breast artifact.   No segmental wall motion abnormalities on gated study.   This study was negative for ischemia.      Recommendation   Medical management.   Clinical correlation is recommended.       AssessmentPlan:   Ant Varghese is a pleasant 63year old female patient who  has a past medical history of Diabetes mellitus ([address] and Hypertension. She was previously evaluated for chest pain, RAVI, leg edema. Stress test 03/2020 revealed an anterior attenuation artifact, no apparent ischemia. Echocardiogram 03/2020 revealed LVH, EF 60-65%, mild MR, Mild TR, RVSP 35-40mmHg, possible PFO/?ASD with normal RA/RV. She has chronic leg edema, improved with stockings, followed at lymphedema clinic. Patient denies chest pain, orthopnea, paroxysmal nocturnal dyspnea, palpitations, dizziness, syncope, weight gain. She reports occasional shortness of breath, dyspnea on exertion. 1. Leg edema - improved  2. HFpEF, chronic  3. Mild pulmonary hypertension   4. PFO Vs ??ASD  5. LVH  6. Hypertensive cardiovascular disease  7. Mild mitral regurgitation  8. EF 60-65% on Echo 03/2020  9. DM  10. Hypertension   11. Leg cellulitis during previous office visit      · Stress 03/2020 revealed an anterior wall attenuation artifact, no apparent ischemia    · LIMIT SALT INTAKE  · Daily weight   · Lasix 40 mg po daily  · BMP, Mg in one week   · ASA 81 mg po daily   · Normal RV/RA on prior Echocardiogram        Above findings and plan of care were discussed with patient in details, patient's questions were answered.      Disposition:  RTC in 6 months             Electronically signed by Johnathan Blue MD, [facility]    2/23/2021 at 10:48 AM EST

## 2021-09-22 ENCOUNTER — OFFICE VISIT (OUTPATIENT)
Dept: CARDIOLOGY CLINIC | Age: 61
End: 2021-09-22
Payer: COMMERCIAL

## 2021-09-22 VITALS
DIASTOLIC BLOOD PRESSURE: 62 MMHG | BODY MASS INDEX: 41.95 KG/M2 | HEIGHT: 70 IN | HEART RATE: 84 BPM | WEIGHT: 293 LBS | SYSTOLIC BLOOD PRESSURE: 112 MMHG

## 2021-09-22 DIAGNOSIS — I50.32 CHRONIC HEART FAILURE WITH PRESERVED EJECTION FRACTION (HFPEF) (HCC): ICD-10-CM

## 2021-09-22 DIAGNOSIS — R00.2 INTERMITTENT PALPITATIONS: ICD-10-CM

## 2021-09-22 DIAGNOSIS — R06.02 SOB (SHORTNESS OF BREATH) ON EXERTION: Primary | ICD-10-CM

## 2021-09-22 PROCEDURE — 93000 ELECTROCARDIOGRAM COMPLETE: CPT | Performed by: INTERNAL MEDICINE

## 2021-09-22 PROCEDURE — 99214 OFFICE O/P EST MOD 30 MIN: CPT | Performed by: INTERNAL MEDICINE

## 2021-09-22 RX ORDER — FUROSEMIDE 40 MG/1
40 TABLET ORAL DAILY
Qty: 90 TABLET | Refills: 3 | Status: SHIPPED | OUTPATIENT
Start: 2021-09-22 | End: 2021-09-22 | Stop reason: SDUPTHER

## 2021-09-22 NOTE — PROGRESS NOTES
[address] [facility] [address]  [place] [address]  Dept: [phone]  Dept Fax: [phone]  Loc: [phone]    Visit Date: 9/22/2021    Ms. Denia Ley is a 63 y.o. female  who presented for:    RAVI  HFpEF     HPI:   KRISH Mena is a pleasant 63year old female patient who  has a past medical history of Acquired lymphedema of leg, Diabetes mellitus ([address] and Hypertension. She was previously evaluated for chest pain, RAVI, leg edema. Stress test 03/2020 revealed an anterior attenuation artifact, no apparent ischemia. Echocardiogram 03/2020 revealed LVH, EF 60-65%, mild MR, Mild TR, RVSP 35-40mmHg, possible PFO/?ASD with normal RA/RV. She has chronic leg edema, improved with stockings, followed at lymphedema clinic. The patient reports chronic RAVI, no recent worsening. Patient denies chest pain, orthopnea, paroxysmal nocturnal dyspnea, palpitations, dizziness, syncope, weight gain. She wears stockings, leg swelling is better.        Current Outpatient Medications:     furosemide (LASIX) 40 MG tablet, Take 1 tablet by mouth daily, Disp: 90 tablet, Rfl: 3    Insulin NPH Isophane & Regular (NOVOLIN 70/30 FLEXPEN) (70-30) 100 UNIT per ML injection pen, Inject into the skin 2 times daily INJECT 26 UNITS BID, Disp: , Rfl:     pioglitazone (ACTOS) 30 MG tablet, Take 30 mg by mouth daily, Disp: , Rfl:     aspirin 81 MG tablet, Take 81 mg by mouth daily, Disp: , Rfl:     cloNIDine (CATAPRES) 0.1 MG tablet, Take 0.1 mg by mouth 2 times daily, Disp: , Rfl:     losartan-hydrochlorothiazide (HYZAAR) 100-12.5 MG per tablet, Take 1 tablet by mouth daily, Disp: , Rfl:     sitaGLIPtan-metformin (JANUMET)  MG per tablet, Take 1 tablet by mouth 2 times daily (with meals), Disp: , Rfl:     lisinopril (PRINIVIL;ZESTRIL) 10 MG tablet, Take 10 mg by mouth daily, Disp: , Rfl:     gemfibrozil (LOPID) 600 MG tablet, Take 600 mg by mouth 2 times pain and hematuria. Musculoskeletal: Negative for arthralgias, joint swelling and neck pain. Neurological: Negative for numbness and headaches. Psychiatric/Behavioral: Negative for agitation, confusion, decreased concentration and dysphoric mood. Objective: There were no vitals taken for this visit. Wt Readings from Last 3 Encounters:   02/23/21 (!) 320 lb 12.8 oz (145.5 kg)   08/13/20 (!) 326 lb 2 oz (147.9 kg)   03/04/20 300 lb (136.1 kg)     BP Readings from Last 3 Encounters:   02/23/21 128/79   08/13/20 128/72   02/13/20 139/78       Nursing note and vitals reviewed. Physical Exam   Constitutional: Oriented to person, place, and time. Appears well-developed and well-nourished. ENT: Moist mucous membranes. No bleeding. Tongue is midline. Head: Normocephalic and atraumatic. Eyes: EOM are normal. Pupils are equal, round, and reactive to light. Neck: Normal range of motion. Neck supple. No JVD present. Cardiovascular: Normal rate, regular rhythm, murmur, no rubs, and intact distal pulses. Pulmonary/Chest: Effort normal and breath sounds normal. No respiratory distress. No wheezes. No rales. Abdominal: Soft. Bowel sounds are normal. No distension. There is no tenderness. Musculoskeletal: Normal range of motion. +1 edema. Neurological: Alert and oriented to person, place, and time. No cranial nerve deficit. Coordination normal.   Skin: Skin is warm and dry. Psychiatric: Normal mood and affect.        No results found for: CKTOTAL, CKMB, CKMBINDEX    No results found for: WBC, RBC, HGB, HCT, MCV, MCH, MCHC, RDW, PLT, MPV    Lab Results   Component Value Date     09/02/2020    K 4.7 09/02/2020     09/02/2020    CO2 24.0 09/02/2020    BUN 28 09/02/2020    CREATININE 0.8 02/19/2020    CALCIUM 9.6 09/02/2020    GLUCOSE 146 09/02/2020       No results found for: ALKPHOS, ALT, AST, PROT, BILITOT, BILIDIR, IBILI, LABALBU    Lab Results   Component Value Date    MG 2.1 09/02/2020       No results found for: INR, PROTIME      No results found for: LABA1C    Lab Results   Component Value Date    TRIG 179 02/19/2020    HDL 43 02/19/2020    LDLCALC 80 02/19/2020       No results found for: TSH      Testing Reviewed:      I have individually reviewed the cardiac test below:    ECHO: Results for orders placed during the hospital encounter of 03/04/20    Echo 2D w doppler w color complete    Narrative  Transthoracic Echocardiography Report (TTE)    Demographics    Patient Name     Dimple Randall Gender                 Female    MR #             [id number]     Race                       Ethnicity    Account #        [de-identified]     Room Number    Accession Number [id number]     Date of Study          03/04/2020    Date of Birth    1960    Referring Physician    Santhosh Mitchell MD    Age              60 year(s)    Al[facility]    Interpreting Physician Santhosh Mitchell MD    Procedure    Type of Study    TTE procedure:ECHOCARDIOGRAM COMPLETE 2D W DOPPLER W COLOR. Procedure Date  Date: 03/04/2020 Start: 09:39 AM    Study Location: Echo Lab  Technical Quality: Adequate visualization    Indications:Congestive heart failure and Increased shortness of breath. Additional Medical History:RAVI, Hypertension, Diabetes, Chest pain, Leg  edema, Colon cancer, Family history of heart disease    Patient Status: Routine    Height: 69.69 inches Weight: 300.01 pounds BSA: 2.47 m^2 BMI: 43.44 kg/m^2    BP: 139/78 mmHg    Conclusions    Summary  Left ventricle size is normal.  Mild concentric left ventricular hypertrophy. There were no regional wall motion abnormalities. Ejection fraction is visually estimated in the range of 60% to 65%. Mild mitral regurgitation is present. Mild tricuspid regurgitation. Right ventricular systolic pressure of 86-63 mmHg consistent with mild  pulmonary hypertension. Possible PFO/ASD visualized.  Further interrogation recommended ml/60 m^2LV ESV/LV ESV    RV Diastolic Dimension: 2.7 cm  Diastolic: 1.1 Index: 27.3 ml/22 m^2  cm             EF Calculated: 63 %       LA/Aorta: 1.52    LA volume/Index: 68.3 ml /28m^2    Doppler Measurements & Calculations    MV Peak E-Wave: 104 cm/s   AV Peak Velocity: 149 LVOT Peak Velocity: 88.7  MV Peak A-Wave: 101 cm/s   cm/s                  cm/s  MV E/A Ratio: 1.03         AV Peak Gradient:     LVOT Peak Gradient: 3  MV Peak Gradient: 4.33     8.88 mmHg             mmHg  mmHg  TV Peak E-Wave: 56.6 cm/s  MV Deceleration Time: 165                        TV Peak A-Wave: 69 cm/s  msec  IVRT: 60 msec         TV Peak Gradient: 1.28  mmHg  MV E' Septal Velocity: 6.2                       TR Velocity:276 cm/s  cm/s                       AV DVI (Vmax):0.6     TR Gradient:30.47 mmHg  MV A' Septal Velocity: 9.2                       PV Peak Velocity: 83.1  cm/s                                             cm/s  MV E' Lateral Velocity:                          PV Peak Gradient: 2.76  6.7 cm/s                                         mmHg  MV A' Lateral Velocity:  10.2 cm/s  E/E' septal: 16.77  E/E' lateral: 15.52  MR Velocity: 419 cm/s    [email or web address] [id number]%3d       Ekg:   EKG Interpretation:  normal EKG, normal sinus rhythm, nonspecific ST and T waves changes, unchanged from previous tracings. Stress Test:03/2020   Summary   LVEF is preserved.   There is mild attenuation artifact noted in the anterior wall seems to be   related to breast artifact.   No segmental wall motion abnormalities on gated study.   This study was negative for ischemia.      Recommendation   Medical management.   Clinical correlation is recommended    AssessmentPlan:   Hanh Jennings is a pleasant 63year old female patient who  has a past medical history of Acquired lymphedema of leg, Diabetes mellitus ([address] and Hypertension. She was previously evaluated for chest pain, RAVI, leg edema. Stress test 03/2020 revealed an anterior attenuation artifact, no apparent ischemia. Echocardiogram 03/2020 revealed LVH, EF 60-65%, mild MR, Mild TR, RVSP 35-40mmHg, possible PFO/?ASD with normal RA/RV. She has chronic leg edema, improved with stockings, followed at lymphedema clinic. The patient reports chronic RAVI, no recent worsening. Patient denies chest pain, orthopnea, paroxysmal nocturnal dyspnea, palpitations, dizziness, syncope, weight gain. She wears stockings, leg swelling is better. 1. Dyspnea on exertion   2. Chronic Leg edema   3. HFpEF, chronic  4. Mild pulmonary hypertension   5. PFO Vs ??ASD (Echo 03/2020)   6. LVH  7. Hypertensive cardiovascular disease  8. Mild mitral regurgitation  9. EF 60-65%   10. DM  11. Hypertension   12. Leg cellulitis during previous office visit      Normal RV/RA on prior Echocardiogram     On Lasix    leg edema persists    PFO/ASD was suspected on prior Echo   She reports RAVI    No acute EKG changes    On lasix    Labs reviewed    K 4.2   Cr 0.9    Increase lasix to 40 mg po daily and 20 mg po Q PM   Check Echo    Avoid salt    ASA   Norvasc    toprol    Lisinopril    Losartan-HCTZ    bp controlled   The patient was instructed to check the blood pressure at home, and record the readings. Patient will call office with blood pressure readings, will adjust patient's antihypertensive medications as needed accordingly      Above findings and plan of care were discussed with patient in details, patient's questions were answered.      Disposition:  RTC in 1 YEAR             Electronically signed by Anum Mora MD, [facility]    9/22/2021 at 12:13 PM EDT

## 2021-09-23 RX ORDER — FUROSEMIDE 40 MG/1
40 TABLET ORAL SEE ADMIN INSTRUCTIONS
Qty: 135 TABLET | Refills: 3 | Status: SHIPPED | OUTPATIENT
Start: 2021-09-23 | End: 2022-09-15 | Stop reason: SDUPTHER

## 2021-10-06 ENCOUNTER — HOSPITAL ENCOUNTER (OUTPATIENT)
Dept: NON INVASIVE DIAGNOSTICS | Age: 61
Discharge: HOME | End: 2021-10-06
Payer: COMMERCIAL

## 2021-10-06 DIAGNOSIS — I50.32 CHRONIC HEART FAILURE WITH PRESERVED EJECTION FRACTION (HFPEF) (HCC): ICD-10-CM

## 2021-10-06 DIAGNOSIS — R06.02 SOB (SHORTNESS OF BREATH) ON EXERTION: ICD-10-CM

## 2021-10-06 DIAGNOSIS — R00.2 INTERMITTENT PALPITATIONS: ICD-10-CM

## 2021-10-06 LAB
LV EF: 63 %
LVEF MODALITY: NORMAL

## 2021-10-06 PROCEDURE — 93306 TTE W/DOPPLER COMPLETE: CPT

## 2021-10-07 ENCOUNTER — TELEPHONE (OUTPATIENT)
Dept: CARDIOLOGY | Age: 61
End: 2021-10-07

## 2022-09-13 LAB
BUN BLDV-MCNC: 18 MG/DL
CALCIUM SERPL-MCNC: 9.2 MG/DL
CHLORIDE BLD-SCNC: 105 MMOL/L
CO2: 24 MMOL/L
CREAT SERPL-MCNC: 0.7 MG/DL
GFR CALCULATED: NORMAL
GLUCOSE BLD-MCNC: 145 MG/DL
MAGNESIUM: 2.1 MG/DL
POTASSIUM SERPL-SCNC: 4.5 MMOL/L
SODIUM BLD-SCNC: 141 MMOL/L

## 2022-09-15 ENCOUNTER — OFFICE VISIT (OUTPATIENT)
Dept: CARDIOLOGY CLINIC | Age: 62
End: 2022-09-15
Payer: COMMERCIAL

## 2022-09-15 VITALS
WEIGHT: 293 LBS | SYSTOLIC BLOOD PRESSURE: 146 MMHG | HEART RATE: 76 BPM | HEIGHT: 70 IN | BODY MASS INDEX: 41.95 KG/M2 | DIASTOLIC BLOOD PRESSURE: 79 MMHG

## 2022-09-15 DIAGNOSIS — I50.32 CHRONIC HEART FAILURE WITH PRESERVED EJECTION FRACTION (HFPEF) (HCC): ICD-10-CM

## 2022-09-15 DIAGNOSIS — R00.2 INTERMITTENT PALPITATIONS: ICD-10-CM

## 2022-09-15 DIAGNOSIS — R06.02 SOB (SHORTNESS OF BREATH) ON EXERTION: Primary | ICD-10-CM

## 2022-09-15 PROCEDURE — 93000 ELECTROCARDIOGRAM COMPLETE: CPT | Performed by: INTERNAL MEDICINE

## 2022-09-15 PROCEDURE — 99214 OFFICE O/P EST MOD 30 MIN: CPT | Performed by: INTERNAL MEDICINE

## 2022-09-15 RX ORDER — MELOXICAM 15 MG/1
15 TABLET ORAL DAILY
COMMUNITY

## 2022-09-15 RX ORDER — FUROSEMIDE 40 MG/1
40 TABLET ORAL SEE ADMIN INSTRUCTIONS
Qty: 135 TABLET | Refills: 3 | Status: SHIPPED | OUTPATIENT
Start: 2022-09-15

## 2022-09-15 RX ORDER — METOPROLOL SUCCINATE 100 MG/1
100 TABLET, EXTENDED RELEASE ORAL DAILY
Qty: 30 TABLET | Refills: 3 | Status: SHIPPED | OUTPATIENT
Start: 2022-09-15 | End: 2022-09-21 | Stop reason: SDUPTHER

## 2022-09-15 NOTE — PROGRESS NOTES
[id number] [facility] [place] [address]  [place] [address]  Dept: [phone]  Dept Fax: [phone]  Loc: [phone]    Visit Date: 9/15/2022    Ms. Anibal Howard is a 57 y.o. female  who presented for:  Chief Complaint   Patient presents with    1 Year Follow Up    Check-Up    Congestive Heart Failure   HFpEF  HPI:   HPI   Luanne Leslie is a pleasant 57year old female patient who  has a past medical history of Acquired lymphedema of leg, Diabetes mellitus ([address] and Hypertension. Stress test 03/2020 revealed an anterior attenuation artifact, no apparent ischemia. Has known h/o HFpEF. Echocardiogram 10/2021 revealed EF 60-65%, Trivial MR, Trace TR. She wearing compression stocking, on diuretics, swelling is better. Has mild chronic SOB, stable. Patient denies chest pain, orthopnea, paroxysmal nocturnal dyspnea, palpitations, dizziness, syncope, weight gain. EKG today revealed SR, poor progression of R wave in the precordial leads, nonspecific TW changes. BP today in office was 146/76.       Current Outpatient Medications:     meloxicam (MOBIC) 15 MG tablet, Take 15 mg by mouth daily, Disp: , Rfl:     furosemide (LASIX) 40 MG tablet, Take 1 tablet by mouth See Admin Instructions TAKE 40 MG PO Q AM AND 20 MG PO Q PM, Disp: 135 tablet, Rfl: 3    Insulin NPH Isophane & Regular (HUMULIN;NOVOLIN) (70-30) 100 UNIT per ML injection pen, Inject into the skin 2 times daily INJECT 26 UNITS BID, Disp: , Rfl:     pioglitazone (ACTOS) 30 MG tablet, Take 30 mg by mouth daily, Disp: , Rfl:     aspirin 81 MG tablet, Take 81 mg by mouth daily, Disp: , Rfl:     cloNIDine (CATAPRES) 0.1 MG tablet, Take 0.1 mg by mouth 2 times daily, Disp: , Rfl:     losartan-hydroCHLOROthiazide (HYZAAR) 100-12.5 MG per tablet, Take 1 tablet by mouth daily, Disp: , Rfl:     sitaGLIPtan-metFORMIN (JANUMET)  MG per tablet, Take 1 tablet by mouth 2 times daily (with meals), Disp: , Rfl:     lisinopril (PRINIVIL;ZESTRIL) 10 MG tablet, Take 10 mg by mouth daily, Disp: , Rfl:     gemfibrozil (LOPID) 600 MG tablet, Take 600 mg by mouth 2 times daily (before meals), Disp: , Rfl:     metoprolol succinate (TOPROL XL) 50 MG extended release tablet, Take 50 mg by mouth daily, Disp: , Rfl:     amLODIPine (NORVASC) 10 MG tablet, Take 10 mg by mouth daily, Disp: , Rfl:     blood glucose test strips (ASCENSIA AUTODISC VI;ONE TOUCH ULTRA TEST VI) strip, 1 each by In Vitro route daily As needed. , Disp: , Rfl:     levothyroxine (SYNTHROID) 112 MCG tablet, Take 112 mcg by mouth Daily, Disp: , Rfl:     Calcium Carb-Cholecalciferol (CALCIUM 600+D3 PO), Take by mouth, Disp: , Rfl:     Coenzyme Q10-Red Yeast Rice (CO Q-10 PLUS RED YEAST RICE PO), Take by mouth, Disp: , Rfl:     Multiple Vitamins-Minerals (THERAPEUTIC MULTIVITAMIN-MINERALS) tablet, Take 1 tablet by mouth daily, Disp: , Rfl:     Past Medical History  Irvin Aldridge  has a past medical history of Acquired lymphedema of leg, Diabetes mellitus ([address] and Hypertension. Social History  Irvin Aldridge  reports that she has never smoked. She has never used smokeless tobacco.    Family History  Irvin Aldridge family history is not on file. There is no family history of bicuspid aortic valve, aneurysms, heart transplant, pacemakers, defibrillators, or sudden cardiac death. Past Surgical History   Past Surgical History:   Procedure Laterality Date    GALLBLADDER SURGERY         Review of Systems   Constitutional: Negative for chills and fever  HENT: Negative for congestion, sinus pressure, sneezing and sore throat. Eyes: Negative for pain, discharge, redness and itching. Respiratory: Negative for apnea, cough  Gastrointestinal: Negative for blood in stool, constipation, diarrhea   Endocrine: Negative for cold intolerance, heat intolerance, polydipsia. Genitourinary: Negative for dysuria, enuresis, flank pain and hematuria.    Musculoskeletal: Negative for arthralgias, joint swelling and neck pain. Neurological: Negative for numbness and headaches. Psychiatric/Behavioral: Negative for agitation, confusion, decreased concentration and dysphoric mood. Objective:     BP (!) 146/79   Pulse 76   Ht 5' 10\" (1.778 m)   Wt (!) 331 lb 3.2 oz (150.2 kg)   BMI 47.52 kg/m²     Wt Readings from Last 3 Encounters:   09/15/22 (!) 331 lb 3.2 oz (150.2 kg)   09/22/21 (!) 330 lb (149.7 kg)   02/23/21 (!) 320 lb 12.8 oz (145.5 kg)     BP Readings from Last 3 Encounters:   09/15/22 (!) 146/79   09/22/21 112/62   02/23/21 128/79       Nursing note and vitals reviewed. Physical Exam   Constitutional: Oriented to person, place, and time. Appears well-developed and well-nourished. ENT: Moist mucous membranes. No bleeding. Tongue is midline. Head: Normocephalic and atraumatic. Eyes: EOM are normal. Pupils are equal, round, and reactive to light. Neck: Normal range of motion. Neck supple. No JVD present. Cardiovascular: Normal rate, regular rhythm, no murmur, no rubs, and intact distal pulses. Pulmonary/Chest: Effort normal and breath sounds normal. No respiratory distress. No wheezes. No rales. Abdominal: Soft. Bowel sounds are normal. No distension. There is no tenderness. Musculoskeletal: Normal range of motion. mild edema. Neurological: Alert and oriented to person, place, and time. No cranial nerve deficit. Coordination normal.   Skin: Skin is warm and dry. Psychiatric: Normal mood and affect.        No results found for: CKTOTAL, CKMB, CKMBINDEX    No results found for: WBC, RBC, HGB, HCT, MCV, MCH, MCHC, RDW, PLT, MPV    Lab Results   Component Value Date/Time     09/13/2022 12:00 AM    K 4.5 09/13/2022 12:00 AM     09/13/2022 12:00 AM    CO2 24 09/13/2022 12:00 AM    BUN 18 09/13/2022 12:00 AM    CREATININE 0.7 09/13/2022 12:00 AM    CALCIUM 9.2 09/13/2022 12:00 AM    GLUCOSE 145 09/13/2022 12:00 AM       No results found for: ALKPHOS, ALT, AST, PROT, BILITOT, BILIDIR, IBILI, LABALBU    Lab Results   Component Value Date/Time    MG 2.1 09/13/2022 12:00 AM       No results found for: INR, PROTIME      No results found for: LABA1C    Lab Results   Component Value Date/Time    TRIG 179 02/19/2020 12:00 AM    HDL 43 02/19/2020 12:00 AM    LDLCALC 80 02/19/2020 12:00 AM       No results found for: TSH      Testing Reviewed:      I have individually reviewed the cardiac test below:    ECHO: Results for orders placed during the hospital encounter of 10/06/21    Echo 2D w doppler w color complete    Narrative  Transthoracic Echocardiography Report (TTE)    Demographics    Patient Name    [address] [facility] Gender                Female    MR #            [id number]     Race                      Ethnicity    Account #       [de-identified]     Room Number    Accession       [id number]    Date of Study         10/06/2021  Number    Date of Birth   1960    Referring Physician   Didier Nicolas [facility]    Age             63 year(s)    Sonographer           Edie Valdez MD  Physician    Procedure    Type of Study    TTE procedure:ECHOCARDIOGRAM COMPLETE 2D W DOPPLER W COLOR. Procedure Date  Date: 10/06/2021 Start: 09:18 AM    Study Location: Echo Lab  Technical Quality: Limited visualization due to body habitus. Indications:Congestive heart failure and Dyspnea on exertion. Additional Medical History:Diabetic, hypertension, edema, LVH    Patient Status: Routine    Height: 70 inches Weight: 330.01 pounds BSA: 2.58 m^2 BMI: 47.35 kg/m^2    BP: 112/62 mmHg    Conclusions    Summary  Normal left ventricle size and systolic function. Ejection fraction was  estimated at 60-65%. There were no regional left ventricular wall motion  abnormalities and wall thickness was within normal limits. Trivial mitral regurgitation.   Trace tricuspid regurgitation. Signature    ----------------------------------------------------------------  Electronically signed by Aneudy Lopez MD (Interpreting  physician) on 10/06/2021 at 03:03 PM  ----------------------------------------------------------------    Findings    Mitral Valve  The mitral valve structure was normal with normal leaflet separation. DOPPLER: The transmitral velocity was within the normal range with no  evidence for mitral stenosis. There was evidence of Trivial mitral  regurgitation. Aortic Valve  The aortic valve was trileaflet with normal thickness and cuspal  separation. DOPPLER: Transaortic velocity was within the normal range with  no evidence of aortic stenosis. There was no evidence of aortic  regurgitation. Tricuspid Valve  The tricuspid valve structure was normal with normal leaflet separation. DOPPLER: There was no evidence of tricuspid stenosis. There was evidence  of Trace tricuspid regurgitation. Pulmonic Valve  The pulmonic valve leaflets exhibited normal thickness, no calcification,  and normal cuspal separation. DOPPLER: The transpulmonic velocity was  within the normal range with no evidence for regurgitation. Left Atrium  Left atrial size was normal.    Left Ventricle  Normal left ventricle size and systolic function. Ejection fraction was  estimated at 60-65%. There were no regional left ventricular wall motion  abnormalities and wall thickness was within normal limits. Right Atrium  Right atrial size was normal.    Right Ventricle  The right ventricular size was normal with normal systolic function and  wall thickness. Pericardial Effusion  The pericardium was normal in appearance with no evidence of a pericardial  effusion. Pleural Effusion  No evidence of pleural effusion. Aorta / Great Vessels  -Aortic root dimension within normal limits.  -The Pulmonary artery is within normal limits.   -IVC size is within normal limits with normal respiratory phasic changes. M-Mode/2D Measurements & Calculations    LV Diastolic    LV Systolic Dimension:    AV Cusp Separation: 1.9 cmLA  Dimension: 5.7  3.8 cm                    Dimension: 4 cmAO Root  cm              LV Volume Diastolic: 470  Dimension: 2.9 cmLA Area: 16.8  LV FS:33.3 %    ml                        cm^2  LV PW           LV Volume Systolic: 62 ml  Diastolic: 0.9  LV EDV/LV EDV Index: 160  cm              ml/62 m^2LV ESV/LV ESV  Septum          Index: 62 ml/24 m^2       RV Diastolic Dimension: 2.6 cm  Diastolic: 1.1  EF Calculated: 61.3 %  cm                                        LA/Aorta: 1.38    LA volume/Index: 48.4 ml /19m^2    Doppler Measurements & Calculations    MV Peak E-Wave: 114 cm/s  AV Peak Velocity: 148  LVOT Peak Velocity: 97.3  MV Peak A-Wave: 91.7 cm/s cm/s                   cm/s  MV E/A Ratio: 1.24        AV Peak Gradient: 8.76 LVOT Peak Gradient: 4  MV Peak Gradient: 5.2     mmHg                   mmHg  mmHg  TV Peak E-Wave: 81 cm/s  MV Deceleration Time: 211                        TV Peak A-Wave: 93.8 cm/s  msec  MV P1/2t: 62 msec         IVRT: 70 msec          TV Peak Gradient: 2.62  MVA by PHT:3.55 cm^2                             mmHg  TR Velocity:252 cm/s  MV E' Septal Velocity:    AV DVI (Vmax):0.66     TR Gradient:25.4 mmHg  7.7 cm/s                                         PV Peak Velocity: 65.6  MV A' Septal Velocity:                           cm/s  10.6 cm/s                                        PV Peak Gradient: 1.72  MV E' Lateral Velocity:                          mmHg  9.6 cm/s  MV A' Lateral Velocity:  7.1 cm/s  E/E' septal: 14.81  E/E' lateral: 11.88    [email or web address] [id number]     Stress Test:03/2020   Summary   LVEF is preserved. There is mild attenuation artifact noted in the anterior wall seems to be   related to breast artifact.    No segmental wall motion abnormalities on gated study. This study was negative for ischemia. Recommendation   Medical management. Clinical correlation is recommended. AssessmentPlan:   Ashley Sinclair is a pleasant 57year old female patient who  has a past medical history of Acquired lymphedema of leg, Diabetes mellitus ([address] and Hypertension. Stress test 03/2020 revealed an anterior attenuation artifact, no apparent ischemia. Has known h/o HFpEF. Echocardiogram 10/2021 revealed EF 60-65%, Trivial MR, Trace TR. She wearing compression stocking, on diuretics, swelling is better. Has mild chronic SOB, stable. Patient denies chest pain, orthopnea, paroxysmal nocturnal dyspnea, palpitations, dizziness, syncope, weight gain. EKG today revealed SR, poor progression of R wave in the precordial leads, nonspecific TW changes. BP today in office was 146/76. Chronic SOB  Chronic leg swelling   HFpEF, chronic  LVH  Hypertensive cardiovascular disease  DM  Hypertension, inadequately controlled      Patient reports some recent intentional weight loss, encouraged   The patient is advised to begin progressive daily aerobic exercise program and attempt to lose weight. Limit Na intake  Daily weight   Call office if significant weight gain occurs   LASIX 40 MG PO  Q AM, 20 MG Q PM  Uncontrolled HTN  /76  Increase Toprol to 100 mg po daily   On norvasc  Lisinopril   Cr 0.7, K 4.5   The patient was instructed to check the blood pressure at home, and record the readings. Patient will call office with blood pressure readings, will adjust patient's antihypertensive medications as needed accordingly     Above findings and plan of care were discussed with patient in details, patient's questions were answered.      Disposition:  RTC in 12 months             Electronically signed by Florencio Santoro MD, [facility]    9/15/2022 at 3:08 PM EDT

## 2022-09-19 NOTE — TELEPHONE ENCOUNTER
Plz clarify if patient was taking taking toprol 100 in Am and 50 in PM at time of last office visit ?     If so, may increase Toprol to 100 mg po BID

## 2022-09-19 NOTE — TELEPHONE ENCOUNTER
Pt called today states Dr Kira Rios wanted to increase her Toprol to 100 mg at her last appt   Her med list stated she was taking 50 mg daily  Pt didn't realize her list that she provided was not up today       Pt has been taking toprol 100 in Am and 50 in PM   Asking if DR Sahni would like to increase  ?

## 2022-09-21 RX ORDER — METOPROLOL SUCCINATE 100 MG/1
100 TABLET, EXTENDED RELEASE ORAL 2 TIMES DAILY
Qty: 60 TABLET | Refills: 11 | Status: SHIPPED | OUTPATIENT
Start: 2022-09-21

## 2022-09-21 NOTE — TELEPHONE ENCOUNTER
Patient confirms she was taking toprol 100 mg in the am and 50 mg in the pm.  Patient notified on Dr. Tanesha Harper orders to increase to 100 mg BID. Script pended for pharmacy.

## 2023-01-13 RX ORDER — METOPROLOL SUCCINATE 100 MG/1
TABLET, EXTENDED RELEASE ORAL
Qty: 90 TABLET | Refills: 2 | Status: SHIPPED | OUTPATIENT
Start: 2023-01-13

## 2023-09-11 RX ORDER — FUROSEMIDE 40 MG/1
TABLET ORAL
Qty: 135 TABLET | Refills: 0 | Status: SHIPPED | OUTPATIENT
Start: 2023-09-11

## 2023-09-21 ENCOUNTER — OFFICE VISIT (OUTPATIENT)
Dept: CARDIOLOGY CLINIC | Age: 63
End: 2023-09-21
Payer: COMMERCIAL

## 2023-09-21 VITALS
WEIGHT: 293 LBS | HEART RATE: 71 BPM | BODY MASS INDEX: 41.95 KG/M2 | HEIGHT: 70 IN | SYSTOLIC BLOOD PRESSURE: 159 MMHG | DIASTOLIC BLOOD PRESSURE: 83 MMHG

## 2023-09-21 DIAGNOSIS — R00.2 INTERMITTENT PALPITATIONS: ICD-10-CM

## 2023-09-21 DIAGNOSIS — I20.8 STABLE ANGINA PECTORIS (HCC): ICD-10-CM

## 2023-09-21 DIAGNOSIS — I50.32 CHRONIC HEART FAILURE WITH PRESERVED EJECTION FRACTION (HFPEF) (HCC): Primary | ICD-10-CM

## 2023-09-21 PROCEDURE — 99214 OFFICE O/P EST MOD 30 MIN: CPT | Performed by: INTERNAL MEDICINE

## 2023-09-21 PROCEDURE — 93000 ELECTROCARDIOGRAM COMPLETE: CPT | Performed by: INTERNAL MEDICINE

## 2023-09-21 RX ORDER — ESCITALOPRAM OXALATE 20 MG/1
TABLET ORAL
COMMUNITY
Start: 2023-08-29 | End: 2023-10-18

## 2023-09-21 RX ORDER — LEVOTHYROXINE SODIUM 137 UG/1
TABLET ORAL
COMMUNITY
Start: 2023-09-19

## 2023-09-21 RX ORDER — FUROSEMIDE 40 MG/1
TABLET ORAL
Qty: 135 TABLET | Refills: 3 | Status: SHIPPED | OUTPATIENT
Start: 2023-09-21

## 2023-09-21 NOTE — PROGRESS NOTES
[address] [facility]  Dept: [phone]  Dept Fax: [phone]  Loc: [phone]    Visit Date: 9/21/2023    Ms. Thaddeus Bojorquez is a 60 y.o. female  who presented for:  HFpEF  HPI:   HPI   Michaela Grant is a pleasant 60year old female patient who  has a past medical history of Acquired lymphedema of leg, Diabetes mellitus ([address]), and Hypertension. Has known h/o HFpEF. Stress test 03/2020 revealed an anterior attenuation artifact, no apparent ischemia. Echocardiogram 10/2021 revealed EF 60-65%, Trivial MR, Trace TR. BP was previously noted to be inadequately controlled. BP today is 159/83. The patient has chronic leg swelling, better per patient. She wears leg stockings. Has h/o lymphedema, previously seen at the lymphedema clinic. She has occasional chest pain described as discomfort/heaviness, retrosternal, not radiating, 2/10, may occur with rest or with walking, self limiting. She has shortness of breath and dyspnea on exertion. She thinks her RAVI has worsened in the past few months.        Current Outpatient Medications:     furosemide (LASIX) 40 MG tablet, TAKE ONE TABLET BY MOUTH EVERY MORNING AND TAKE 1/2 TABLET BY MOUTH EVERY EVENING, Disp: 135 tablet, Rfl: 0    metoprolol succinate (TOPROL XL) 100 MG extended release tablet, Take 1 tablet by mouth 2 times daily, Disp: 180 tablet, Rfl: 1    meloxicam (MOBIC) 15 MG tablet, Take 15 mg by mouth daily, Disp: , Rfl:     Insulin NPH Isophane & Regular (HUMULIN;NOVOLIN) (70-30) 100 UNIT per ML injection pen, Inject into the skin 2 times daily INJECT 26 UNITS BID, Disp: , Rfl:     pioglitazone (ACTOS) 30 MG tablet, Take 30 mg by mouth daily, Disp: , Rfl:     aspirin 81 MG tablet, Take 81 mg by mouth daily, Disp: , Rfl:     cloNIDine (CATAPRES) 0.1 MG tablet, Take 0.1 mg by mouth 2 times daily, Disp: , Rfl:     losartan-hydroCHLOROthiazide (HYZAAR) 100-12.5 MG per

## 2023-09-28 LAB
BUN BLDV-MCNC: 20 MG/DL
CALCIUM SERPL-MCNC: 9.4 MG/DL
CHLORIDE BLD-SCNC: 103 MMOL/L
CO2: 28 MMOL/L
CREAT SERPL-MCNC: 0.8 MG/DL
EGFR: 78
GLUCOSE BLD-MCNC: 172 MG/DL
MAGNESIUM: 2 MG/DL
POTASSIUM SERPL-SCNC: 4.4 MMOL/L
SODIUM BLD-SCNC: 142 MMOL/L

## 2023-10-13 ENCOUNTER — HOSPITAL ENCOUNTER (OUTPATIENT)
Dept: NON INVASIVE DIAGNOSTICS | Age: 63
Discharge: HOME | End: 2023-10-13
Attending: INTERNAL MEDICINE
Payer: COMMERCIAL

## 2023-10-13 ENCOUNTER — TELEPHONE (OUTPATIENT)
Dept: CARDIOLOGY CLINIC | Age: 63
End: 2023-10-13

## 2023-10-13 VITALS — HEIGHT: 70 IN | BODY MASS INDEX: 41.95 KG/M2 | WEIGHT: 293 LBS

## 2023-10-13 DIAGNOSIS — I20.8 STABLE ANGINA PECTORIS: ICD-10-CM

## 2023-10-13 DIAGNOSIS — R00.2 INTERMITTENT PALPITATIONS: ICD-10-CM

## 2023-10-13 DIAGNOSIS — I50.32 CHRONIC HEART FAILURE WITH PRESERVED EJECTION FRACTION (HFPEF) (HCC): ICD-10-CM

## 2023-10-13 PROCEDURE — 93017 CV STRESS TEST TRACING ONLY: CPT | Performed by: INTERNAL MEDICINE

## 2023-10-13 PROCEDURE — 93306 TTE W/DOPPLER COMPLETE: CPT

## 2023-10-13 PROCEDURE — A9500 TC99M SESTAMIBI: HCPCS | Performed by: INTERNAL MEDICINE

## 2023-10-13 PROCEDURE — 3430000000 HC RX DIAGNOSTIC RADIOPHARMACEUTICAL: Performed by: INTERNAL MEDICINE

## 2023-10-13 PROCEDURE — 78452 HT MUSCLE IMAGE SPECT MULT: CPT

## 2023-10-13 PROCEDURE — 6360000002 HC RX W HCPCS

## 2023-10-13 RX ORDER — TETRAKIS(2-METHOXYISOBUTYLISOCYANIDE)COPPER(I) TETRAFLUOROBORATE 1 MG/ML
31.8 INJECTION, POWDER, LYOPHILIZED, FOR SOLUTION INTRAVENOUS
Status: COMPLETED | OUTPATIENT
Start: 2023-10-13 | End: 2023-10-13

## 2023-10-13 RX ORDER — TETRAKIS(2-METHOXYISOBUTYLISOCYANIDE)COPPER(I) TETRAFLUOROBORATE 1 MG/ML
9.1 INJECTION, POWDER, LYOPHILIZED, FOR SOLUTION INTRAVENOUS
Status: COMPLETED | OUTPATIENT
Start: 2023-10-13 | End: 2023-10-13

## 2023-10-13 RX ADMIN — Medication 31.8 MILLICURIE: at 10:25

## 2023-10-13 RX ADMIN — Medication 9.1 MILLICURIE: at 09:39

## 2023-10-13 NOTE — TELEPHONE ENCOUNTER
P2P can be performed @ [phone]. Tracking# [id number]. The Lexiscan stress test was approved.  Pt currently on the schedule for the ECHO this morning 10/13 @8:45

## 2023-10-13 NOTE — TELEPHONE ENCOUNTER
Opened this encounter at [id number]. Echo already completed. Echo is still not authorized. They are requesting result of stress test to be faxed to [phone] before they will authorize echo.    Call reference # [id number]

## 2023-10-16 ENCOUNTER — TELEPHONE (OUTPATIENT)
Dept: CARDIOLOGY CLINIC | Age: 63
End: 2023-10-16

## 2023-10-18 NOTE — TELEPHONE ENCOUNTER
Chart reviewed  H/o CHF  On lasix and Losartan-hydrochlorothiazide   Stress test was negative for ischemia (copied below)  Echo findings are c/w patient's history of HFpEF  Plz inform patient and inquire about symptoms (ex. SOB, RAVI, Leg swelling, chest pain . Valaria Daft .)            Summary   Mild attenuation artifact noted in the anterior wall seems to be related   to breast artifact. No segmental wall motion abnormalities on gated study. Myocardial perfusion imaging is not suggestive for myocardial ischemia. Recommendation   Clinical correlation is recommended.       Signatures      ----------------------------------------------------------------   Electronically signed by Maria Isabel Casper MD (Interpreting   Cardiologist) on 10/13/2023 at 15:30   ----------------------------------------------------------------

## 2023-10-18 NOTE — TELEPHONE ENCOUNTER
Pt returned call and voiced understanding of results. Pt reports some SOB on exertion and occasional CP; denies any other sxs.

## 2023-12-02 RX ORDER — METOPROLOL SUCCINATE 100 MG/1
100 TABLET, EXTENDED RELEASE ORAL 2 TIMES DAILY
Qty: 180 TABLET | Refills: 1 | Status: SHIPPED | OUTPATIENT
Start: 2023-12-02

## 2024-05-30 RX ORDER — METOPROLOL SUCCINATE 100 MG/1
100 TABLET, EXTENDED RELEASE ORAL 2 TIMES DAILY
Qty: 180 TABLET | Refills: 1 | Status: SHIPPED | OUTPATIENT
Start: 2024-05-30

## 2024-09-24 ENCOUNTER — OFFICE VISIT (OUTPATIENT)
Dept: CARDIOLOGY CLINIC | Age: 64
End: 2024-09-24
Payer: COMMERCIAL

## 2024-09-24 VITALS
HEART RATE: 70 BPM | WEIGHT: 293 LBS | HEIGHT: 70 IN | DIASTOLIC BLOOD PRESSURE: 81 MMHG | SYSTOLIC BLOOD PRESSURE: 164 MMHG | BODY MASS INDEX: 41.95 KG/M2

## 2024-09-24 DIAGNOSIS — I50.32 CHRONIC HEART FAILURE WITH PRESERVED EJECTION FRACTION (HFPEF) (HCC): Primary | ICD-10-CM

## 2024-09-24 DIAGNOSIS — R00.2 INTERMITTENT PALPITATIONS: ICD-10-CM

## 2024-09-24 PROCEDURE — 93000 ELECTROCARDIOGRAM COMPLETE: CPT | Performed by: INTERNAL MEDICINE

## 2024-09-24 PROCEDURE — 99214 OFFICE O/P EST MOD 30 MIN: CPT | Performed by: INTERNAL MEDICINE

## 2024-09-24 RX ORDER — FUROSEMIDE 40 MG
TABLET ORAL
Qty: 135 TABLET | Refills: 3 | Status: SHIPPED | OUTPATIENT
Start: 2024-09-24

## 2024-09-24 RX ORDER — SEMAGLUTIDE 1.34 MG/ML
INJECTION, SOLUTION SUBCUTANEOUS
COMMUNITY
Start: 2024-09-12

## 2024-09-24 RX ORDER — CARVEDILOL 25 MG/1
25 TABLET ORAL 2 TIMES DAILY
Qty: 60 TABLET | Refills: 3 | Status: SHIPPED | OUTPATIENT
Start: 2024-09-24

## 2024-09-24 RX ORDER — OMEGA-3-ACID ETHYL ESTERS 1 G/1
2 CAPSULE, LIQUID FILLED ORAL 2 TIMES DAILY
COMMUNITY

## 2025-01-20 RX ORDER — CARVEDILOL 25 MG/1
25 TABLET ORAL 2 TIMES DAILY
Qty: 180 TABLET | Refills: 2 | Status: SHIPPED | OUTPATIENT
Start: 2025-01-20